# Patient Record
Sex: MALE | Race: BLACK OR AFRICAN AMERICAN | URBAN - METROPOLITAN AREA
[De-identification: names, ages, dates, MRNs, and addresses within clinical notes are randomized per-mention and may not be internally consistent; named-entity substitution may affect disease eponyms.]

---

## 2017-12-19 ENCOUNTER — EMERGENCY (EMERGENCY)
Facility: HOSPITAL | Age: 37
LOS: 0 days | Discharge: HOME | End: 2017-12-19
Attending: EMERGENCY MEDICINE | Admitting: EMERGENCY MEDICINE
Payer: COMMERCIAL

## 2017-12-19 VITALS
RESPIRATION RATE: 16 BRPM | DIASTOLIC BLOOD PRESSURE: 65 MMHG | HEART RATE: 60 BPM | OXYGEN SATURATION: 100 % | SYSTOLIC BLOOD PRESSURE: 152 MMHG

## 2017-12-19 VITALS — WEIGHT: 179.9 LBS | HEIGHT: 69 IN

## 2017-12-19 DIAGNOSIS — F17.210 NICOTINE DEPENDENCE, CIGARETTES, UNCOMPLICATED: ICD-10-CM

## 2017-12-19 DIAGNOSIS — S16.1XXA STRAIN OF MUSCLE, FASCIA AND TENDON AT NECK LEVEL, INITIAL ENCOUNTER: ICD-10-CM
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
## 2017-12-19 DIAGNOSIS — S13.9XXA SPRAIN OF JOINTS AND LIGAMENTS OF UNSPECIFIED PARTS OF NECK, INITIAL ENCOUNTER: ICD-10-CM
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
## 2017-12-19 PROCEDURE — 99283 EMERGENCY DEPT VISIT LOW MDM: CPT

## 2017-12-19 RX ORDER — CYCLOBENZAPRINE HYDROCHLORIDE 10 MG/1
1 TABLET, FILM COATED ORAL
Qty: 12 | Refills: 0 | OUTPATIENT
Start: 2017-12-19

## 2017-12-19 RX ORDER — IBUPROFEN 200 MG
1 TABLET ORAL
Qty: 15 | Refills: 0 | OUTPATIENT
Start: 2017-12-19

## 2017-12-19 RX ORDER — METHOCARBAMOL 500 MG/1
500 TABLET, FILM COATED ORAL ONCE
Qty: 0 | Refills: 0 | Status: COMPLETED | OUTPATIENT
Start: 2017-12-19 | End: 2017-12-19

## 2017-12-19 RX ORDER — LIDOCAINE 4 G/100G
1 CREAM TOPICAL ONCE
Qty: 0 | Refills: 0 | Status: COMPLETED | OUTPATIENT
Start: 2017-12-19 | End: 2017-12-19

## 2017-12-19 RX ORDER — IBUPROFEN 200 MG
600 TABLET ORAL ONCE
Qty: 0 | Refills: 0 | Status: COMPLETED | OUTPATIENT
Start: 2017-12-19 | End: 2017-12-19

## 2017-12-19 RX ADMIN — Medication 600 MILLIGRAM(S): at 10:33

## 2017-12-19 RX ADMIN — LIDOCAINE 1 PATCH: 4 CREAM TOPICAL at 10:33

## 2017-12-19 RX ADMIN — METHOCARBAMOL 500 MILLIGRAM(S): 500 TABLET, FILM COATED ORAL at 10:33

## 2017-12-19 NOTE — ED STATDOCS - NS_ ATTENDINGSCRIBEDETAILS _ED_A_ED_FT
I, Yovany Manzanares MD,  performed the initial face to face bedside interview with this patient regarding history of present illness, review of symptoms and relevant past medical, social and family history.  I completed an independent physical examination.    The history, relevant review of systems, past medical and surgical history, medical decision making, and physical examination was documented by the scribe in my presence and I attest to the accuracy of the documentation.

## 2017-12-19 NOTE — ED STATDOCS - ATTENDING CONTRIBUTION TO CARE
I, Yovany Manzanares MD,  performed the initial face to face bedside interview with this patient regarding history of present illness, review of symptoms and relevant past medical, social and family history.  I completed an independent physical examination.  I was the initial provider who evaluated this patient. I have signed out the follow up of any pending tests (i.e. labs, radiological studies) to the ACP.  I have communicated the patient’s plan of care and disposition with the ACP.

## 2017-12-19 NOTE — ED STATDOCS - OBJECTIVE STATEMENT
36 y/o M presents to ED c/o right side of neck pain starting today. Pt states he thinks he pulled something yesterday when lifting something at work, no pain yesterday. No direct trauma to neck. C/o pain when taking a deep breath. Denies fever, chills, N/V/D, or any other acute complaints.

## 2017-12-19 NOTE — ED ADULT NURSE NOTE - OBJECTIVE STATEMENT
pt presents to ED with right sided neck pain. pt states he woke up with the p[ain. pt states he lifted something heavy at work yesterday. pt ambulates without difficulty. denies numbness/tingling to extremities/ aox3. will continue to monitor and reassess

## 2017-12-19 NOTE — ED STATDOCS - PROGRESS NOTE DETAILS
Patient seen and evaluated, has musculoskeletal neck pain, will treat with ibuprofen and muscle relaxants, reviewed symptom care and return precautions -Chong Navarrete PA-C
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
## 2023-09-03 ENCOUNTER — EMERGENCY (EMERGENCY)
Facility: HOSPITAL | Age: 43
LOS: 0 days | Discharge: HOME | End: 2023-09-03
Attending: EMERGENCY MEDICINE
Payer: COMMERCIAL

## 2023-09-03 VITALS
SYSTOLIC BLOOD PRESSURE: 131 MMHG | WEIGHT: 220.02 LBS | OXYGEN SATURATION: 98 % | DIASTOLIC BLOOD PRESSURE: 75 MMHG | HEIGHT: 72 IN | HEART RATE: 55 BPM | TEMPERATURE: 98 F | RESPIRATION RATE: 17 BRPM

## 2023-09-03 DIAGNOSIS — M54.50 LOW BACK PAIN, UNSPECIFIED: ICD-10-CM
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
## 2023-09-03 DIAGNOSIS — S39.012A STRAIN OF MUSCLE, FASCIA AND TENDON OF LOWER BACK, INITIAL ENCOUNTER: ICD-10-CM

## 2023-09-03 PROCEDURE — 99283 EMERGENCY DEPT VISIT LOW MDM: CPT

## 2023-09-03 PROCEDURE — 99284 EMERGENCY DEPT VISIT MOD MDM: CPT

## 2023-09-03 RX ORDER — ACETAMINOPHEN 500 MG
650 TABLET ORAL ONCE
Refills: 0 | Status: COMPLETED | OUTPATIENT
Start: 2023-09-03 | End: 2023-09-03

## 2023-09-03 RX ORDER — IBUPROFEN 200 MG
600 TABLET ORAL ONCE
Refills: 0 | Status: COMPLETED | OUTPATIENT
Start: 2023-09-03 | End: 2023-09-03

## 2023-09-03 RX ORDER — LIDOCAINE 4 G/100G
1 CREAM TOPICAL ONCE
Refills: 0 | Status: COMPLETED | OUTPATIENT
Start: 2023-09-03 | End: 2023-09-03

## 2023-09-03 RX ADMIN — Medication 600 MILLIGRAM(S): at 23:27

## 2023-09-03 RX ADMIN — Medication 650 MILLIGRAM(S): at 23:27

## 2023-09-03 RX ADMIN — LIDOCAINE 1 PATCH: 4 CREAM TOPICAL at 23:27

## 2023-09-03 NOTE — ED ADULT NURSE NOTE - NSFALLUNIVINTERV_ED_ALL_ED
Bed/Stretcher in lowest position, wheels locked, appropriate side rails in place/Call bell, personal items and telephone in reach/Instruct patient to call for assistance before getting out of bed/chair/stretcher/Non-slip footwear applied when patient is off stretcher/[place] to call system/Physically safe environment - no spills, clutter or unnecessary equipment/Purposeful proactive rounding/Room/bathroom lighting operational, light cord in reach

## 2023-09-03 NOTE — ED PROVIDER NOTE - CLINICAL SUMMARY MEDICAL DECISION MAKING FREE TEXT BOX
RICE therapy advised patient neurovascular intact return to ED for intractable back pain new onset motor or sensory deficits any overall worsening patient agrees to plan of care
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
## 2023-09-03 NOTE — ED PROVIDER NOTE - EYES, MLM
Clear bilaterally, pupils equal, round and reactive to light. Finasteride Male Counseling: Finasteride Counseling:  I discussed with the patient the risks of use of finasteride including but not limited to decreased libido, decreased ejaculate volume, gynecomastia, and depression. Women should not handle medication.  All of the patient's questions and concerns were addressed. Finasteride Counseling:  I discussed with the patient the risks of use of finasteride including but not limited to decreased libido, decreased ejaculate volume, gynecomastia, and depression. Women should not handle medication.  All of the patient's questions and concerns were addressed.

## 2023-09-03 NOTE — ED PROVIDER NOTE - MUSCULOSKELETAL, MLM
positive tenderness palpation right lower lateral lumbar paraspinal region no skin hematoma.  No midline tenderness.

## 2023-09-03 NOTE — ED PROVIDER NOTE - PATIENT PORTAL LINK FT
You can access the FollowMyHealth Patient Portal offered by [facility] by registering at the following website: [email or web address]. By joining [facility]’s FollowMyHealth portal, you will also be able to view your health information using other applications (apps) compatible with our system.

## 2023-09-03 NOTE — ED PROVIDER NOTE - NSFOLLOWUPINSTRUCTIONS_ED_ALL_ED_FT
Anesthesia Pre Eval Note    Anesthesia ROS/Med Hx        Anesthetic Complication History:  Patient does not have a history of anesthetic complications      Neuro/Psych Review:  Patient does not have a neuro/psych history       Cardiovascular Review:    Positive for dysrhythmias (a flutter s/p ablation 2009)  Positive for hypertension  Positive for hyperlipidemia    GI/HEPATIC/RENAL Review:    Positive for renal disease - chronic renal insufficiency    End/Other Review:    Positive for arthritis  Positive for cancer  Additional Results:     ALLERGIES:  No Known Allergies       Last Labs        Component                Value               Date/Time                  WBC                      6.6                 12/06/2022 1146            RBC                      5.45                12/06/2022 1146            HGB                      17.3 (H)            12/06/2022 1146            HCT                      50.9                12/06/2022 1146            MCV                      93.4                12/06/2022 1146            MCH                      31.7                12/06/2022 1146            MCHC                     34.0                12/06/2022 1146            RDW-CV                   11.4                12/06/2022 1146            Sodium                   140                 12/06/2022 1146            Potassium                4.5                 12/06/2022 1146            Chloride                 106                 12/06/2022 1146            Carbon Dioxide           30                  12/06/2022 1146            Glucose                  89                  12/06/2022 1146            BUN                      29 (H)              12/06/2022 1146            Creatinine               1.43 (H)            12/06/2022 1146            Glomerular Filtrati*     53 (L)              12/06/2022 1146            Calcium                  9.8                 12/06/2022 1146            PLT                      146                  12/06/2022 1146        Past Medical History:  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 DEVI Rosales  No date: Deaf      Comment:  right ear only  No date: Erectile dysfunction  No date: HTN (hypertension)  No date: Hyperlipidemia  12/2021: Ileitis      Comment:  Acute IIeitis, no Crohn's disease  12/01/2011: Kidney stone on left side  No date: Malignant neoplasm (CMS/[facility])      Comment:  skin  cancer; face  No date: Paroxysmal atrial fibrillation (CMS/[facility])  No date: Right shoulder pain  02/2019: Syncope      Comment:  dehydration, atrial fibrillation  No date: Urinary tract infection  07/26/2019: Visit for monitoring Tikosyn therapy    Past Surgical History:  10/27/2020: Ablation - atrial fibrillation      Comment:  transeptal   12/10/2018: Anesth,knee arthroscopy; Left  10/2015: Basal cell carcinoma excision      Comment:  left cheek  1998: Brain surgery      Comment:  acoustic neuroma  01/14/2013: Colonoscopy diagnostic  10/06/2017: Colonoscopy diagnostic      Comment:  tubular polyp 3 yr recall  12/15/2021: Colonoscopy diagnostic      Comment:  Colonoscopy 5 year recall  12/10/2009: Ep ablation      Comment:  for atrial flutter  08/05/1998: External ear surgery  No date: Finger surgery; Left      Comment:  pinky finger  05/1995: Hernia repair; Right  2014: Joint replacement      Comment:  left shoulder replacement; Dr. Fox  10/16/2020: Joint replacement      Comment:  right reversed total shoulder arthroplasty  No date: Kidney stone surgery  05/15/2014: Mri shoulder left      Comment:  With IV  sedation       Prior to Admission medications :  Medication traMADol (ULTRAM) 50 MG tablet, Sig Take 1 tablet by mouth every 6 hours as needed for Pain., Start Date 11/23/22, End Date , Taking? Yes, Authorizing Provider Skip Milner PA-C    Medication Xarelto 20 MG Tab, Sig TAKE 1 TABLET BY MOUTH  DAILY WITH DINNER, Start Date 11/14/22, End Date , Taking? Yes, Authorizing Provider Roberto Morales MD    Medication losartan (COZAAR) 100 MG tablet, Sig TAKE 1 TABLET BY MOUTH  DAILY, Start Date 8/12/22, End Date , Taking? Yes, Authorizing Provider Roberto Morales MD    Medication doxazosin (CARDURA) 2 MG tablet, Sig TAKE 1 TABLET BY MOUTH AT  NIGHT, Start Date 8/12/22, End Date , Taking? Yes, Authorizing Provider Major Schroeder MD    Medication allopurinol (ZYLOPRIM) 100 MG tablet, Sig TAKE 1 TABLET BY MOUTH  DAILY, Start Date 7/20/22, End Date , Taking? Yes, Authorizing Provider Major Schroeder MD    Medication simvastatin (ZOCOR) 40 MG tablet, Sig TAKE 1 TABLET BY MOUTH AT  NIGHT, Start Date 7/20/22, End Date , Taking? Yes, Authorizing Provider Major Schroeder MD    Medication metoPROLOL succinate (TOPROL-XL) 100 MG 24 hr tablet, Sig Take 1 tablet by mouth 2 times daily., Start Date 5/12/22, End Date , Taking? Yes, Authorizing Provider Roberto Morales MD    Medication aluminum chloride (DRYSOL) 20 % topical solution, Sig Apply topically nightly as needed (excessive sweating)., Start Date 12/21/17, End Date , Taking? Yes, Authorizing Provider Vern Morales MD    Medication Coenzyme Q10 200 MG Cap, Sig Take 200 mg by mouth daily., Start Date , End Date , Taking? Yes, Authorizing Provider Outside Provider    Medication COVID-19 mRNA bivalent, Moderna, 50 MCG/0.5ML Suspension, Sig Inject 0.5 mLs into the muscle once., Start Date 11/18/22, End Date , Taking? , Authorizing Provider Burt Day MD    Medication influenza virus quadrivalent vaccine inactivated, PRESERVATIVE FREE, (Flulaval Quadrivalent) 0.5 ML injection, Sig  Inject 0.5 mLs into the muscle 1 time for 1 dose, Start Date 11/18/22, End Date , Taking? , Authorizing Provider Burt Day MD         Patient Vitals in the past 24 hrs:  12/14/22 0630, BP:(!) 146/102, Temp:36.3 °C (97.3 °F), Temp src:Temporal, Pulse:68, Resp:20, SpO2:98 %, Height:6' 2\" (1.88 m), Weight:94.8 kg (209 lb)      Relevant Problems   No relevant active problems       Physical Exam     Airway   Mallampati: II  TM Distance: >3 FB  Neck ROM: Full  TMJ Mobility: Good    Cardiovascular  Cardiovascular exam normal  Cardio Rhythm: Regular  Cardio Rate: Normal    Head Assessment  Head assessment: Normocephalic and Atraumatic    General Assessment  General Assessment: Alert and oriented and No acute distress    Dental Exam  Dental exam normal    Pulmonary Exam  Pulmonary exam normal    Abdominal Exam  Abdominal exam normal      Anesthesia Plan:    ASA Status: 3  Anesthesia Type: General    Induction: Intravenous  Preferred Airway Type: LMA  Maintenance: Combined  Premedication: None      Post-op Pain Management: Per Surgeon      Checklist  Reviewed: Anesthesia Record, Medications, Problem list, Past Med History, Patient Summary and DNR Status  Consent/Risks Discussed Statement:  The proposed anesthetic plan, including its risks and benefits, have been discussed with the Patient along with the risks and benefits of alternatives. Questions were encouraged and answered and the patient and/or representative understands and agrees to proceed.    I have discussed elements of the patient's history or examination, as noted above and/or as follows, that place the patient at higher risk of complications; heart disease and kidney disease.    I discussed with the patient (and/or patient's legal representative) the risks and benefits of the proposed anesthesia plan, General, which may include services performed by other anesthesia providers.    Alternative anesthesia plans, if available, were reviewed with the patient  (and/or patient's legal representative). Discussion has been held with the patient (and/or patient's legal representative) regarding risks of anesthesia, which include allergic reaction, anxiety, depressed breathing, dental injury, intra-operative awareness, memory loss, nausea, sore throat, emergence delirium, eye injury, headache, need for blood transfusion, oral injury, bleeding/hematoma, hypotension, death and aspiration and emergent situations that may require change in anesthesia plan.    The patient (and/or patient's legal representative) has indicated understanding, his/her questions have been answered, and he/she wishes to proceed with the planned anesthetic.      Blood Products: Not Anticipated     Back Pain    Back pain is very common in adults. The cause of back pain is rarely dangerous and the pain often gets better over time. The cause of your back pain may not be known and may include strain of muscles or ligaments, degeneration of the spinal disks, or arthritis. Occasionally the pain may radiate down your leg(s). Over-the-counter medicines to reduce pain and inflammation are often the most helpful. Stretching and remaining active frequently helps the healing process.     SEEK IMMEDIATE MEDICAL CARE IF YOU HAVE ANY OF THE FOLLOWING SYMPTOMS: bowel or bladder control problems, unusual weakness or numbness in your arms or legs, nausea or vomiting, abdominal pain, fever, dizziness/lightheadedness.
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
